# Patient Record
Sex: MALE | Race: AMERICAN INDIAN OR ALASKA NATIVE
[De-identification: names, ages, dates, MRNs, and addresses within clinical notes are randomized per-mention and may not be internally consistent; named-entity substitution may affect disease eponyms.]

---

## 2018-01-10 ENCOUNTER — HOSPITAL ENCOUNTER (OUTPATIENT)
Dept: HOSPITAL 92 - ERS | Age: 78
Setting detail: OBSERVATION
LOS: 2 days | Discharge: HOME | End: 2018-01-12
Attending: FAMILY MEDICINE | Admitting: FAMILY MEDICINE
Payer: MEDICARE

## 2018-01-10 VITALS — BODY MASS INDEX: 25.7 KG/M2

## 2018-01-10 DIAGNOSIS — Z90.89: ICD-10-CM

## 2018-01-10 DIAGNOSIS — D23.4: ICD-10-CM

## 2018-01-10 DIAGNOSIS — Z79.899: ICD-10-CM

## 2018-01-10 DIAGNOSIS — I10: ICD-10-CM

## 2018-01-10 DIAGNOSIS — Z86.73: ICD-10-CM

## 2018-01-10 DIAGNOSIS — Z79.82: ICD-10-CM

## 2018-01-10 DIAGNOSIS — R11.2: Primary | ICD-10-CM

## 2018-01-10 DIAGNOSIS — E78.5: ICD-10-CM

## 2018-01-10 DIAGNOSIS — G50.0: ICD-10-CM

## 2018-01-10 LAB
ALBUMIN SERPL BCG-MCNC: 4.7 G/DL (ref 3.4–4.8)
ALP SERPL-CCNC: 77 U/L (ref 40–150)
ALT SERPL W P-5'-P-CCNC: 18 U/L (ref 8–55)
ANION GAP SERPL CALC-SCNC: 13 MMOL/L (ref 10–20)
AST SERPL-CCNC: 23 U/L (ref 5–34)
BASOPHILS # BLD AUTO: 0 THOU/UL (ref 0–0.2)
BASOPHILS NFR BLD AUTO: 0.5 % (ref 0–1)
BILIRUB SERPL-MCNC: 0.8 MG/DL (ref 0.2–1.2)
BUN SERPL-MCNC: 13 MG/DL (ref 8.4–25.7)
CALCIUM SERPL-MCNC: 9.8 MG/DL (ref 7.8–10.44)
CHLORIDE SERPL-SCNC: 91 MMOL/L (ref 98–107)
CK MB SERPL-MCNC: 1.5 NG/ML (ref 0–6.6)
CO2 SERPL-SCNC: 29 MMOL/L (ref 23–31)
CREAT CL PREDICTED SERPL C-G-VRATE: 0 ML/MIN (ref 70–130)
CRYSTAL-AUWI FLAG: 0 (ref 0–15)
EOSINOPHIL # BLD AUTO: 0.1 THOU/UL (ref 0–0.7)
EOSINOPHIL NFR BLD AUTO: 0.7 % (ref 0–10)
GLOBULIN SER CALC-MCNC: 3.2 G/DL (ref 2.4–3.5)
GLUCOSE SERPL-MCNC: 128 MG/DL (ref 83–110)
HEV IGM SER QL: 0.6 (ref 0–7.99)
HGB BLD-MCNC: 15.4 G/DL (ref 14–18)
HYALINE CASTS #/AREA URNS LPF: (no result) LPF
LYMPHOCYTES # BLD: 1.2 THOU/UL (ref 1.2–3.4)
LYMPHOCYTES NFR BLD AUTO: 13.3 % (ref 21–51)
MCH RBC QN AUTO: 31.3 PG (ref 27–31)
MCV RBC AUTO: 93.6 FL (ref 80–94)
MONOCYTES # BLD AUTO: 0.4 THOU/UL (ref 0.11–0.59)
MONOCYTES NFR BLD AUTO: 4.5 % (ref 0–10)
NEUTROPHILS # BLD AUTO: 7.1 THOU/UL (ref 1.4–6.5)
NEUTROPHILS NFR BLD AUTO: 81 % (ref 42–75)
PATHC CAST-AUWI FLAG: 0.13 (ref 0–2.49)
PLATELET # BLD AUTO: 174 THOU/UL (ref 130–400)
POTASSIUM SERPL-SCNC: 4.5 MMOL/L (ref 3.5–5.1)
PROT UR STRIP.AUTO-MCNC: 100 MG/DL
RBC # BLD AUTO: 4.92 MILL/UL (ref 4.7–6.1)
RBC UR QL AUTO: (no result) HPF (ref 0–3)
SODIUM SERPL-SCNC: 128 MMOL/L (ref 136–145)
SP GR UR STRIP: 1.02 (ref 1–1.03)
SPERM-AUWI FLAG: 0 (ref 0–9.9)
TROPONIN I SERPL DL<=0.01 NG/ML-MCNC: 0.01 NG/ML (ref ?–0.03)
WBC # BLD AUTO: 8.7 THOU/UL (ref 4.8–10.8)
YEAST-AUWI FLAG: 0 (ref 0–25)

## 2018-01-10 PROCEDURE — 80053 COMPREHEN METABOLIC PANEL: CPT

## 2018-01-10 PROCEDURE — 36415 COLL VENOUS BLD VENIPUNCTURE: CPT

## 2018-01-10 PROCEDURE — 82553 CREATINE MB FRACTION: CPT

## 2018-01-10 PROCEDURE — 96372 THER/PROPH/DIAG INJ SC/IM: CPT

## 2018-01-10 PROCEDURE — 85025 COMPLETE CBC W/AUTO DIFF WBC: CPT

## 2018-01-10 PROCEDURE — 81003 URINALYSIS AUTO W/O SCOPE: CPT

## 2018-01-10 PROCEDURE — 96375 TX/PRO/DX INJ NEW DRUG ADDON: CPT

## 2018-01-10 PROCEDURE — 84484 ASSAY OF TROPONIN QUANT: CPT

## 2018-01-10 PROCEDURE — G0378 HOSPITAL OBSERVATION PER HR: HCPCS

## 2018-01-10 PROCEDURE — 96376 TX/PRO/DX INJ SAME DRUG ADON: CPT

## 2018-01-10 PROCEDURE — 83735 ASSAY OF MAGNESIUM: CPT

## 2018-01-10 PROCEDURE — 80048 BASIC METABOLIC PNL TOTAL CA: CPT

## 2018-01-10 PROCEDURE — 81015 MICROSCOPIC EXAM OF URINE: CPT

## 2018-01-10 PROCEDURE — 96361 HYDRATE IV INFUSION ADD-ON: CPT

## 2018-01-10 PROCEDURE — 83690 ASSAY OF LIPASE: CPT

## 2018-01-10 PROCEDURE — 93005 ELECTROCARDIOGRAM TRACING: CPT

## 2018-01-10 PROCEDURE — 99285 EMERGENCY DEPT VISIT HI MDM: CPT

## 2018-01-10 PROCEDURE — 96374 THER/PROPH/DIAG INJ IV PUSH: CPT

## 2018-01-10 PROCEDURE — A4216 STERILE WATER/SALINE, 10 ML: HCPCS

## 2018-01-10 PROCEDURE — 74022 RADEX COMPL AQT ABD SERIES: CPT

## 2018-01-10 NOTE — RAD
CHEST ONE VIEW 

ABDOMEN TWO VIEWS:

 

History: Vomiting. 

 

FINDINGS/IMPRESSION: 

The heart size is normal. The lungs are clear. No free air or differential air fluid levels are seen.
 The bowel gas pattern is unremarkable. There are degenerative changes of the spine. 

 

POS: [facility]

## 2018-01-11 LAB
ANION GAP SERPL CALC-SCNC: 14 MMOL/L (ref 10–20)
BUN SERPL-MCNC: 13 MG/DL (ref 8.4–25.7)
CALCIUM SERPL-MCNC: 8.5 MG/DL (ref 7.8–10.44)
CHLORIDE SERPL-SCNC: 93 MMOL/L (ref 98–107)
CO2 SERPL-SCNC: 25 MMOL/L (ref 23–31)
CREAT CL PREDICTED SERPL C-G-VRATE: 93 ML/MIN (ref 70–130)
GLUCOSE SERPL-MCNC: 120 MG/DL (ref 83–110)
MAGNESIUM SERPL-MCNC: 1.6 MG/DL (ref 1.6–2.6)
POTASSIUM SERPL-SCNC: 3.8 MMOL/L (ref 3.5–5.1)
SODIUM SERPL-SCNC: 128 MMOL/L (ref 136–145)
TROPONIN I SERPL DL<=0.01 NG/ML-MCNC: 0.01 NG/ML (ref ?–0.03)
TROPONIN I SERPL DL<=0.01 NG/ML-MCNC: 0.01 NG/ML (ref ?–0.03)

## 2018-01-11 RX ADMIN — ASPIRIN SCH MG: 81 TABLET ORAL at 09:25

## 2018-01-11 RX ADMIN — Medication SCH: at 09:25

## 2018-01-11 RX ADMIN — Medication SCH: at 20:25

## 2018-01-11 NOTE — HP
HISTORY OF PRESENT ILLNESS:  Patient is a 77-year-old male who presented to the emergency room compla
ining of nausea, vomiting.  The patient has been noting continuous vomiting for approximately 16 hour
s prior to admission.  He noted he has a history of hypertension, TIA, trigeminal neuralgia, cervical
 meningioma.  He has had intractable vomiting, no diarrhea, no fever, no body aches.  He does note ot
her members of the family have had some flu-like illnesses.  He has not noted any abdominal pain.  He
 states he has tried multiple things to relieve his vomiting.  He does note he has been able to pass 
gas.  He has not noted any fever otherwise.  He was seen and evaluated in the ER and was found to be 
slightly hypertensive.  He denies any chest pain or shortness of breath otherwise noted.

 

At this time, he is currently resting in bed comfortable, feeling a little bit better.

 

ALLERGIES:  He has no known allergies.

 

CURRENT MEDICATIONS:  Aspirin 81 mg daily, flecainide 50 mg daily, Vascepa 1 gram daily, Plavix 75 mg
 daily, Crestor 10 mg daily, famotidine 10 mg daily, losartan 100 mg daily.

 

PAST MEDICAL HISTORY:  Significant for hyperlipidemia, he does have a history of _____ trigeminal kyler
ralgia, hypertension.  He also has a meningioma in the posterior neck.

 

PAST SURGICAL HISTORY:  Positive for tonsillectomy.

 

SOCIAL/PERSONAL HISTORY:  He does drink.  He does not smoke.

 

REVIEW OF SYSTEMS:  Gastrointestinal:  Positive as above.  Genitourinary:  Negative.

Cardiovascular:  Otherwise, negative.  Neurological:  Otherwise, negative.

 

PHYSICAL EXAMINATION:

VITAL SIGNS:  Temperature 98.6, pulse 68, respirations 16, O2 sats 95%, blood pressure 106/51.

GENERAL:  He is alert, active, in no acute distress.

HEENT:  Normocephalic, atraumatic.  Extraocular muscles intact.  Sclerae and conjunctivae clear.  Thr
oat clear.

NECK:  Supple, full range of motion, no masses.

LUNGS:  Clear.

HEART:  Regular rate and rhythm without murmurs, gallops or rubs.

ABDOMEN:  Soft.  Bowel sounds are present and active.  There is no hepatosplenomegaly is noted.  Ther
e is no evidence of rebound or guarding otherwise noted at this time.

EXTREMITIES:  Reveal no evidence of clubbing, edema or cyanosis otherwise noted.

 

LABORATORY DATA AND X-RAY FINDINGS:  His white blood count 8.7, hemoglobin 15.4, hematocrit 46.1, sod
ium 128, potassium 3.8, chloride 93, CO2 of 25, BUN 13, creatinine 0.81.  Serum troponins are otherwi
se negative.  Urinalysis is clear.  Acute abdominal series shows no evidence of any significant obstr
uction, normal bowel pattern is otherwise noted.

 

IMPRESSION:  Intractable vomiting seems to be improving at this time, possibly a viral mediated.

 

PLAN:  The patient has been placed in observation.  We will be continuing monitoring.  IV fluids will
 be given.  Further treatment and recommendations based on his improvement.

## 2018-01-12 VITALS — DIASTOLIC BLOOD PRESSURE: 77 MMHG | SYSTOLIC BLOOD PRESSURE: 160 MMHG

## 2018-01-12 VITALS — TEMPERATURE: 98.6 F

## 2018-01-12 LAB
ANION GAP SERPL CALC-SCNC: 10 MMOL/L (ref 10–20)
BASOPHILS # BLD AUTO: 0 THOU/UL (ref 0–0.2)
BASOPHILS NFR BLD AUTO: 0.4 % (ref 0–1)
BUN SERPL-MCNC: 13 MG/DL (ref 8.4–25.7)
CALCIUM SERPL-MCNC: 8.6 MG/DL (ref 7.8–10.44)
CHLORIDE SERPL-SCNC: 99 MMOL/L (ref 98–107)
CO2 SERPL-SCNC: 29 MMOL/L (ref 23–31)
CREAT CL PREDICTED SERPL C-G-VRATE: 80 ML/MIN (ref 70–130)
EOSINOPHIL # BLD AUTO: 0.1 THOU/UL (ref 0–0.7)
EOSINOPHIL NFR BLD AUTO: 1.5 % (ref 0–10)
GLUCOSE SERPL-MCNC: 94 MG/DL (ref 83–110)
HGB BLD-MCNC: 13.1 G/DL (ref 14–18)
LYMPHOCYTES # BLD: 2.5 THOU/UL (ref 1.2–3.4)
LYMPHOCYTES NFR BLD AUTO: 27.6 % (ref 21–51)
MCH RBC QN AUTO: 31.9 PG (ref 27–31)
MCV RBC AUTO: 94.7 FL (ref 80–94)
MONOCYTES # BLD AUTO: 0.8 THOU/UL (ref 0.11–0.59)
MONOCYTES NFR BLD AUTO: 9.4 % (ref 0–10)
NEUTROPHILS # BLD AUTO: 5.4 THOU/UL (ref 1.4–6.5)
NEUTROPHILS NFR BLD AUTO: 61.1 % (ref 42–75)
PLATELET # BLD AUTO: 169 THOU/UL (ref 130–400)
POTASSIUM SERPL-SCNC: 4 MMOL/L (ref 3.5–5.1)
RBC # BLD AUTO: 4.12 MILL/UL (ref 4.7–6.1)
SODIUM SERPL-SCNC: 134 MMOL/L (ref 136–145)
WBC # BLD AUTO: 8.9 THOU/UL (ref 4.8–10.8)

## 2018-01-12 RX ADMIN — ASPIRIN SCH MG: 81 TABLET ORAL at 08:47

## 2018-01-12 RX ADMIN — Medication SCH: at 08:51

## 2018-01-12 NOTE — DPRG
DATE OF SERVICE:  01/12/2018

 

Mr. Lazaro is resting well.  He reports no complaints.  He is tolerating all p.o. intake.

 

VITAL SIGNS:   Temperature 98.6, /67.

 

LABORATORY:  Hemoglobin 13.1, hematocrit 39.0, white blood count 8.9, potassium is 4.0.

 

HOSPITAL SUMMARY:  He has resolved his intractable vomiting.  He is now tolerating all p.o. liquids.

 

DISCHARGE DIAGNOSES:  Intractable vomiting.

 

DATE OF ADMISSION:  01/11/2018

 

DATE OF DISCHARGE:  01/12/2018  

 

The patient will be discharged home on his home medications.  Follow up with me on a p.r.n. basis.

## 2019-01-22 ENCOUNTER — HOSPITAL ENCOUNTER (OUTPATIENT)
Dept: HOSPITAL 92 - SCSMRI | Age: 79
Discharge: HOME | End: 2019-01-22
Payer: MEDICARE

## 2019-01-22 DIAGNOSIS — G50.0: Primary | ICD-10-CM

## 2019-01-22 DIAGNOSIS — M47.892: ICD-10-CM

## 2019-01-22 PROCEDURE — 72141 MRI NECK SPINE W/O DYE: CPT

## 2019-01-22 NOTE — MRI
MRI CERVICAL SPINE WITHOUT CONTRAST:

 

HISTORY:

Trigeminal neuralgia.  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

 

COMPARISON:

None.

 

CORRELATION:

Brain MRI from 04/25/2017.

 

TECHNIQUE:

An MRI of the cervical spine is performed with and without intravenous Gadolinium administration.  Mu
ltisequential, multiplanar imaging is performed.

 

FINDINGS:

Based on current protocol, a left parapharyngeal mass and a right dural prepontine mass cannot be ass
essed.  In order to assess those lesions, an MRI of the soft tissues of the neck and a brain MRI shou
ld be performed, respectively.  Both studies should be with and without contrast.

 

There is appropriate T1 marrow signal intensity of the cervical vertebrae.  Cervical spine vertebral 
body height is maintained.  There is no fracture.  Minimal type II modic changes at C6-C7.

 

There is no prevertebral soft tissue swelling or edema.  No MR evidence of ligamentous injury on the 
sagittal STIR sequence.

 

The visualized brain parenchyma, cervicomedullary junction, cervical cord, and upper thoracic cord ha
ve normal size and signal intensity.

 

On the sagittal images, there is evidence of an abnormal signal intensity in the right CP angle ciste
rn, incompletely evaluated.

 

C2-C3:  No significant central canal stenosis.  The foramina are patent.

 

C3-C4:  No significant central canal stenosis.  Mild right and moderate left foraminal narrowing due 
to uncovertebral hypertrophy.

 

C4-C5:  There is a central/left paracentral disk protrusion.  There is mass effect and deformity of t
he ventral thecal sac.  Mild mass effect upon the left hemicord.  No cord signal abnormality.  Mild c
entral canal stenosis.  Moderate right and moderate to severe left foraminal narrowing due to uncover
tebral hypertrophy.

 

C5-C6:  There is moderate loss of disk space height.  There is a broad-based disk osteophyte complex.
  The ventral subarachnoid space is effaced.  Minimal flattening of the cervical cord.  Mild to moder
ate central canal stenosis.  No signal abnormality in the cord.  Mild right and mild to moderate left
 foraminal narrowing due to uncovertebral hypertrophy.

 

C6-C7:  Broad-based disk osteophyte complex with mild central canal stenosis.  The right neural good
en is patent.  Minimal left foraminal narrowing.

 

C7-T1:  No significant central canal stenosis or foraminal narrowing.

 

IMPRESSION:

1.  Degenerative changes of the cervical spine, as described above.

 

2.  Based on the current protocol, a left parapharyngeal mass and a right cerebellopontine angle cist
peyton mass cannot be adequately assessed.  If there is concern, a pre and post contrast soft tissue nec
k MRI and a pre and post contrast brain MRI can be performed, respectively.

 

POS: [facility]

## 2019-01-28 ENCOUNTER — HOSPITAL ENCOUNTER (OUTPATIENT)
Dept: HOSPITAL 92 - SLEEPLAB | Age: 79
Discharge: HOME | End: 2019-01-28
Attending: FAMILY MEDICINE
Payer: MEDICARE

## 2019-01-28 DIAGNOSIS — G47.33: Primary | ICD-10-CM

## 2019-01-28 PROCEDURE — 95811 POLYSOM 6/>YRS CPAP 4/> PARM: CPT

## 2019-07-04 ENCOUNTER — HOSPITAL ENCOUNTER (EMERGENCY)
Dept: HOSPITAL 92 - SCSER | Age: 79
Discharge: HOME | End: 2019-07-04
Payer: MEDICARE

## 2019-07-04 DIAGNOSIS — Z79.82: ICD-10-CM

## 2019-07-04 DIAGNOSIS — E78.5: ICD-10-CM

## 2019-07-04 DIAGNOSIS — Z79.899: ICD-10-CM
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
## 2019-07-04 DIAGNOSIS — I10: ICD-10-CM

## 2019-07-04 DIAGNOSIS — S60.222A: Primary | ICD-10-CM

## 2019-07-04 DIAGNOSIS — Z86.73: ICD-10-CM

## 2019-07-04 PROCEDURE — 99283 EMERGENCY DEPT VISIT LOW MDM: CPT

## 2019-07-20 ENCOUNTER — HOSPITAL ENCOUNTER (EMERGENCY)
Dept: HOSPITAL 92 - SCSER | Age: 79
Discharge: HOME | End: 2019-07-20
Payer: MEDICARE

## 2019-07-20 DIAGNOSIS — I10: ICD-10-CM

## 2019-07-20 DIAGNOSIS — Z79.01: ICD-10-CM
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
## 2019-07-20 DIAGNOSIS — E78.5: ICD-10-CM

## 2019-07-20 DIAGNOSIS — S60.450A: Primary | ICD-10-CM

## 2019-07-20 DIAGNOSIS — Z79.82: ICD-10-CM

## 2019-07-20 PROCEDURE — 96372 THER/PROPH/DIAG INJ SC/IM: CPT

## 2019-07-20 NOTE — RAD
THREE VIEWS OF THE RIGHT SECOND FINGER 

7/20/19

 

COMPARISON: 

None.

 

HISTORY: 

Injury, trauma, pain.

 

FINDINGS: 

There is a punctate radiopaque foreign body suspected within the soft tissues distal to the second di
stal phalanx measuring 2 mm in length. No displaced fracture or evidence of dislocation is noted.

 

IMPRESSION: 

Findings suggesting a punctate foreign body within the soft tissues distal to the second distal phala
nx. 

 

POS: [facility]

## 2021-05-06 ENCOUNTER — HOSPITAL ENCOUNTER (INPATIENT)
Dept: HOSPITAL 92 - ERS | Age: 81
LOS: 1 days | Discharge: HOSPICE | DRG: 23 | End: 2021-05-07
Attending: INTERNAL MEDICINE | Admitting: INTERNAL MEDICINE
Payer: MEDICARE

## 2021-05-06 VITALS — BODY MASS INDEX: 24.3 KG/M2

## 2021-05-06 DIAGNOSIS — J96.01: ICD-10-CM

## 2021-05-06 DIAGNOSIS — G81.91: ICD-10-CM

## 2021-05-06 DIAGNOSIS — I61.5: ICD-10-CM

## 2021-05-06 DIAGNOSIS — G50.0: ICD-10-CM

## 2021-05-06 DIAGNOSIS — I16.1: ICD-10-CM

## 2021-05-06 DIAGNOSIS — G93.5: ICD-10-CM

## 2021-05-06 DIAGNOSIS — Z90.89: ICD-10-CM

## 2021-05-06 DIAGNOSIS — D72.829: ICD-10-CM

## 2021-05-06 DIAGNOSIS — Z79.82: ICD-10-CM

## 2021-05-06 DIAGNOSIS — Z66: ICD-10-CM

## 2021-05-06 DIAGNOSIS — Z51.5: ICD-10-CM

## 2021-05-06 DIAGNOSIS — I48.0: ICD-10-CM

## 2021-05-06 DIAGNOSIS — E87.6: ICD-10-CM

## 2021-05-06 DIAGNOSIS — I10: ICD-10-CM

## 2021-05-06 DIAGNOSIS — I67.848: ICD-10-CM

## 2021-05-06 DIAGNOSIS — Z79.899: ICD-10-CM

## 2021-05-06 DIAGNOSIS — E78.00: ICD-10-CM

## 2021-05-06 DIAGNOSIS — E78.5: ICD-10-CM

## 2021-05-06 DIAGNOSIS — Z86.018: ICD-10-CM

## 2021-05-06 DIAGNOSIS — Z20.822: ICD-10-CM

## 2021-05-06 DIAGNOSIS — R00.1: ICD-10-CM

## 2021-05-06 DIAGNOSIS — Z79.02: ICD-10-CM

## 2021-05-06 DIAGNOSIS — K21.9: ICD-10-CM

## 2021-05-06 DIAGNOSIS — Z86.73: ICD-10-CM

## 2021-05-06 DIAGNOSIS — G91.8: ICD-10-CM

## 2021-05-06 DIAGNOSIS — Z78.1: ICD-10-CM

## 2021-05-06 DIAGNOSIS — R73.03: ICD-10-CM

## 2021-05-06 DIAGNOSIS — I60.4: Primary | ICD-10-CM

## 2021-05-06 LAB
ALBUMIN SERPL BCG-MCNC: 4.8 G/DL (ref 3.4–4.8)
ALP SERPL-CCNC: 71 U/L (ref 40–110)
ALT SERPL W P-5'-P-CCNC: 29 U/L (ref 8–55)
ANALYZER IN CARDIO: (no result)
ANALYZER IN CARDIO: (no result)
ANION GAP SERPL CALC-SCNC: 17 MMOL/L (ref 10–20)
APTT PPP: 25.4 SEC (ref 22.9–36.1)
AST SERPL-CCNC: 34 U/L (ref 5–34)
BASE EXCESS STD BLDA CALC-SCNC: -0.3 MEQ/L
BASE EXCESS STD BLDA CALC-SCNC: -0.8 MEQ/L
BASOPHILS # BLD AUTO: 0.1 THOU/UL (ref 0–0.2)
BASOPHILS NFR BLD AUTO: 0.7 % (ref 0–1)
BILIRUB SERPL-MCNC: 0.8 MG/DL (ref 0.2–1.2)
BUN SERPL-MCNC: 8 MG/DL (ref 8.4–25.7)
CA-I BLDA-SCNC: 1.12 MMOL/L (ref 1.12–1.3)
CA-I BLDA-SCNC: 1.16 MMOL/L (ref 1.12–1.3)
CALCIUM SERPL-MCNC: 10.2 MG/DL (ref 7.8–10.44)
CHLORIDE SERPL-SCNC: 103 MMOL/L (ref 98–107)
CK SERPL-CCNC: 218 U/L (ref 30–200)
CO2 SERPL-SCNC: 24 MMOL/L (ref 23–31)
CREAT CL PREDICTED SERPL C-G-VRATE: 0 ML/MIN (ref 70–130)
EOSINOPHIL # BLD AUTO: 0.1 THOU/UL (ref 0–0.7)
EOSINOPHIL NFR BLD AUTO: 0.7 % (ref 0–10)
GLOBULIN SER CALC-MCNC: 3.2 G/DL (ref 2.4–3.5)
GLUCOSE SERPL-MCNC: 161 MG/DL (ref 83–110)
GLUCOSE UR STRIP-MCNC: 50 MG/DL
HCO3 BLDA-SCNC: 22.5 MEQ/L (ref 22–28)
HCO3 BLDA-SCNC: 23.3 MEQ/L (ref 22–28)
HCT VFR BLDA CALC: 41 % (ref 42–52)
HCT VFR BLDA CALC: 45 % (ref 42–52)
HGB BLD-MCNC: 14.7 G/DL (ref 14–18)
HGB BLDA-MCNC: 13.8 G/DL (ref 14–18)
HGB BLDA-MCNC: 15.2 G/DL (ref 14–18)
INR PPP: 1
LYMPHOCYTES # BLD: 2.3 THOU/UL (ref 1.2–3.4)
LYMPHOCYTES NFR BLD AUTO: 18.4 % (ref 21–51)
MCH RBC QN AUTO: 30.4 PG (ref 27–31)
MCV RBC AUTO: 95.8 FL (ref 78–98)
MONOCYTES # BLD AUTO: 0.5 THOU/UL (ref 0.11–0.59)
MONOCYTES NFR BLD AUTO: 3.9 % (ref 0–10)
NEUTROPHILS # BLD AUTO: 9.4 THOU/UL (ref 1.4–6.5)
NEUTROPHILS NFR BLD AUTO: 76.4 % (ref 42–75)
O2 A-A PPRESDIFF RESPIRATORY: 126.08 MMHG (ref 0–20)
O2 A-A PPRESDIFF RESPIRATORY: 88.2 MMHG (ref 0–20)
PCO2 BLDA: 33.6 MMHG (ref 35–45)
PCO2 BLDA: 34.9 MMHG (ref 35–45)
PH BLDA: 7.44 [PH] (ref 7.35–7.45)
PH BLDA: 7.44 [PH] (ref 7.35–7.45)
PLATELET # BLD AUTO: 146 THOU/UL (ref 130–400)
PO2 BLDA: 115.5 MMHG (ref 70–?)
PO2 BLDA: 582.8 MMHG (ref 70–?)
POTASSIUM BLD-SCNC: 3.19 MMOL/L (ref 3.7–5.3)
POTASSIUM BLD-SCNC: 3.37 MMOL/L (ref 3.7–5.3)
POTASSIUM SERPL-SCNC: 3.3 MMOL/L (ref 3.5–5.1)
PROT UR STRIP.AUTO-MCNC: 10 MG/DL
PROTHROMBIN TIME: 13.5 SEC (ref 12–14.7)
RBC # BLD AUTO: 4.84 MILL/UL (ref 4.7–6.1)
SODIUM SERPL-SCNC: 142 MMOL/L (ref 136–145)
SP GR UR STRIP: 1.01 (ref 1–1.04)
SPECIMEN DRAWN FROM PATIENT: (no result)
SPECIMEN DRAWN FROM PATIENT: (no result)
WBC # BLD AUTO: 12.2 THOU/UL (ref 4.8–10.8)

## 2021-05-06 PROCEDURE — 84484 ASSAY OF TROPONIN QUANT: CPT

## 2021-05-06 PROCEDURE — 0D9670Z DRAINAGE OF STOMACH WITH DRAINAGE DEVICE, VIA NATURAL OR ARTIFICIAL OPENING: ICD-10-PCS | Performed by: NEUROLOGICAL SURGERY

## 2021-05-06 PROCEDURE — 99292 CRITICAL CARE ADDL 30 MIN: CPT

## 2021-05-06 PROCEDURE — 36600 WITHDRAWAL OF ARTERIAL BLOOD: CPT

## 2021-05-06 PROCEDURE — 51702 INSERT TEMP BLADDER CATH: CPT

## 2021-05-06 PROCEDURE — C9113 INJ PANTOPRAZOLE SODIUM, VIA: HCPCS

## 2021-05-06 PROCEDURE — 82805 BLOOD GASES W/O2 SATURATION: CPT

## 2021-05-06 PROCEDURE — 36416 COLLJ CAPILLARY BLOOD SPEC: CPT

## 2021-05-06 PROCEDURE — 87086 URINE CULTURE/COLONY COUNT: CPT

## 2021-05-06 PROCEDURE — 83036 HEMOGLOBIN GLYCOSYLATED A1C: CPT

## 2021-05-06 PROCEDURE — 36415 COLL VENOUS BLD VENIPUNCTURE: CPT

## 2021-05-06 PROCEDURE — 36430 TRANSFUSION BLD/BLD COMPNT: CPT

## 2021-05-06 PROCEDURE — 93005 ELECTROCARDIOGRAM TRACING: CPT

## 2021-05-06 PROCEDURE — 86850 RBC ANTIBODY SCREEN: CPT

## 2021-05-06 PROCEDURE — 70450 CT HEAD/BRAIN W/O DYE: CPT

## 2021-05-06 PROCEDURE — 81003 URINALYSIS AUTO W/O SCOPE: CPT

## 2021-05-06 PROCEDURE — 80053 COMPREHEN METABOLIC PANEL: CPT

## 2021-05-06 PROCEDURE — 86900 BLOOD TYPING SEROLOGIC ABO: CPT

## 2021-05-06 PROCEDURE — 5A1935Z RESPIRATORY VENTILATION, LESS THAN 24 CONSECUTIVE HOURS: ICD-10-PCS | Performed by: NEUROLOGICAL SURGERY

## 2021-05-06 PROCEDURE — 83735 ASSAY OF MAGNESIUM: CPT

## 2021-05-06 PROCEDURE — 94002 VENT MGMT INPAT INIT DAY: CPT

## 2021-05-06 PROCEDURE — 70496 CT ANGIOGRAPHY HEAD: CPT

## 2021-05-06 PROCEDURE — 96365 THER/PROPH/DIAG IV INF INIT: CPT

## 2021-05-06 PROCEDURE — 0BH17EZ INSERTION OF ENDOTRACHEAL AIRWAY INTO TRACHEA, VIA NATURAL OR ARTIFICIAL OPENING: ICD-10-PCS | Performed by: NEUROLOGICAL SURGERY

## 2021-05-06 PROCEDURE — 74018 RADEX ABDOMEN 1 VIEW: CPT

## 2021-05-06 PROCEDURE — 86901 BLOOD TYPING SEROLOGIC RH(D): CPT

## 2021-05-06 PROCEDURE — 30233R1 TRANSFUSION OF NONAUTOLOGOUS PLATELETS INTO PERIPHERAL VEIN, PERCUTANEOUS APPROACH: ICD-10-PCS | Performed by: NEUROLOGICAL SURGERY

## 2021-05-06 PROCEDURE — 85730 THROMBOPLASTIN TIME PARTIAL: CPT

## 2021-05-06 PROCEDURE — P9035 PLATELET PHERES LEUKOREDUCED: HCPCS

## 2021-05-06 PROCEDURE — 85060 BLOOD SMEAR INTERPRETATION: CPT

## 2021-05-06 PROCEDURE — 71045 X-RAY EXAM CHEST 1 VIEW: CPT

## 2021-05-06 PROCEDURE — 96366 THER/PROPH/DIAG IV INF ADDON: CPT

## 2021-05-06 PROCEDURE — 009600Z DRAINAGE OF CEREBRAL VENTRICLE WITH DRAINAGE DEVICE, OPEN APPROACH: ICD-10-PCS | Performed by: NEUROLOGICAL SURGERY

## 2021-05-06 PROCEDURE — 0240U: CPT

## 2021-05-06 PROCEDURE — 85610 PROTHROMBIN TIME: CPT

## 2021-05-06 PROCEDURE — 80048 BASIC METABOLIC PNL TOTAL CA: CPT

## 2021-05-06 PROCEDURE — 85025 COMPLETE CBC W/AUTO DIFF WBC: CPT

## 2021-05-06 PROCEDURE — 82550 ASSAY OF CK (CPK): CPT

## 2021-05-06 PROCEDURE — 94003 VENT MGMT INPAT SUBQ DAY: CPT

## 2021-05-06 PROCEDURE — 31500 INSERT EMERGENCY AIRWAY: CPT

## 2021-05-06 RX ADMIN — LEVETIRACETAM SCH MLS: 5 INJECTION INTRAVENOUS at 20:25

## 2021-05-06 RX ADMIN — NICARDIPINE HYDROCHLORIDE SCH MLS: 25 INJECTION INTRAVENOUS at 20:46

## 2021-05-06 RX ADMIN — CEFAZOLIN SODIUM SCH MLS: 2 SOLUTION INTRAVENOUS at 20:36

## 2021-05-06 RX ADMIN — POTASSIUM CHLORIDE SCH: 14.9 INJECTION, SOLUTION INTRAVENOUS at 18:00

## 2021-05-06 RX ADMIN — POTASSIUM CHLORIDE SCH MLS: 14.9 INJECTION, SOLUTION INTRAVENOUS at 20:10

## 2021-05-07 ENCOUNTER — HOSPITAL ENCOUNTER (INPATIENT)
Dept: HOSPITAL 92 - CCU | Age: 81
LOS: 3 days | DRG: 951 | End: 2021-05-10
Payer: COMMERCIAL

## 2021-05-07 VITALS — TEMPERATURE: 98.3 F

## 2021-05-07 DIAGNOSIS — D72.829: ICD-10-CM

## 2021-05-07 DIAGNOSIS — Z90.89: ICD-10-CM

## 2021-05-07 DIAGNOSIS — E78.5: ICD-10-CM

## 2021-05-07 DIAGNOSIS — Z51.5: Primary | ICD-10-CM

## 2021-05-07 DIAGNOSIS — I16.1: ICD-10-CM

## 2021-05-07 DIAGNOSIS — I10: ICD-10-CM

## 2021-05-07 DIAGNOSIS — I67.1: ICD-10-CM

## 2021-05-07 DIAGNOSIS — Z98.890: ICD-10-CM

## 2021-05-07 DIAGNOSIS — I48.0: ICD-10-CM

## 2021-05-07 DIAGNOSIS — Z79.82: ICD-10-CM

## 2021-05-07 DIAGNOSIS — I60.9: ICD-10-CM

## 2021-05-07 DIAGNOSIS — J96.01: ICD-10-CM

## 2021-05-07 DIAGNOSIS — E87.6: ICD-10-CM

## 2021-05-07 DIAGNOSIS — G81.91: ICD-10-CM

## 2021-05-07 DIAGNOSIS — Z79.02: ICD-10-CM

## 2021-05-07 LAB
ANION GAP SERPL CALC-SCNC: 19 MMOL/L (ref 10–20)
BUN SERPL-MCNC: 11 MG/DL (ref 8.4–25.7)
CALCIUM SERPL-MCNC: 9 MG/DL (ref 7.8–10.44)
CHLORIDE SERPL-SCNC: 107 MMOL/L (ref 98–107)
CO2 SERPL-SCNC: 18 MMOL/L (ref 23–31)
CREAT CL PREDICTED SERPL C-G-VRATE: 75 ML/MIN (ref 70–130)
GLUCOSE SERPL-MCNC: 137 MG/DL (ref 83–110)
HGB BLD-MCNC: 14.1 G/DL (ref 14–18)
MAGNESIUM SERPL-MCNC: 2 MG/DL (ref 1.6–2.6)
MCH RBC QN AUTO: 30.9 PG (ref 27–31)
MCV RBC AUTO: 94.5 FL (ref 78–98)
MDIFF COMPLETE?: YES
PLATELET # BLD AUTO: 126 THOU/UL (ref 130–400)
POTASSIUM SERPL-SCNC: 4.2 MMOL/L (ref 3.5–5.1)
RBC # BLD AUTO: 4.56 MILL/UL (ref 4.7–6.1)
REFLEX FOR REVIEW??: YES
SODIUM SERPL-SCNC: 140 MMOL/L (ref 136–145)
WBC # BLD AUTO: 18.6 THOU/UL (ref 4.8–10.8)

## 2021-05-07 RX ADMIN — CEFAZOLIN SODIUM SCH MLS: 2 SOLUTION INTRAVENOUS at 05:24

## 2021-05-07 RX ADMIN — NICARDIPINE HYDROCHLORIDE SCH MLS: 25 INJECTION INTRAVENOUS at 07:26

## 2021-05-07 RX ADMIN — CEFAZOLIN SODIUM SCH MLS: 2 SOLUTION INTRAVENOUS at 12:34

## 2021-05-07 RX ADMIN — LEVETIRACETAM SCH MLS: 5 INJECTION INTRAVENOUS at 09:44

## 2021-05-09 VITALS — DIASTOLIC BLOOD PRESSURE: 87 MMHG | TEMPERATURE: 103.9 F | SYSTOLIC BLOOD PRESSURE: 167 MMHG
